# Patient Record
Sex: MALE | Race: BLACK OR AFRICAN AMERICAN | Employment: FULL TIME | URBAN - METROPOLITAN AREA
[De-identification: names, ages, dates, MRNs, and addresses within clinical notes are randomized per-mention and may not be internally consistent; named-entity substitution may affect disease eponyms.]

---

## 2023-12-08 ENCOUNTER — TRANSCRIBE ORDERS (OUTPATIENT)
Facility: HOSPITAL | Age: 58
End: 2023-12-08

## 2023-12-08 DIAGNOSIS — N64.4 NIPPLE TENDERNESS: Primary | ICD-10-CM

## 2024-01-04 ENCOUNTER — HOSPITAL ENCOUNTER (OUTPATIENT)
Facility: HOSPITAL | Age: 59
Discharge: HOME | End: 2024-01-04
Payer: COMMERCIAL

## 2024-01-04 ENCOUNTER — HOSPITAL ENCOUNTER (OUTPATIENT)
Facility: HOSPITAL | Age: 59
End: 2024-01-04
Payer: COMMERCIAL

## 2024-01-04 VITALS — HEIGHT: 71 IN | WEIGHT: 244.93 LBS | BODY MASS INDEX: 34.29 KG/M2

## 2024-01-04 DIAGNOSIS — N64.4 NIPPLE TENDERNESS: ICD-10-CM

## 2024-01-04 PROCEDURE — 77066 DX MAMMO INCL CAD BI: CPT

## 2025-04-28 ENCOUNTER — OFFICE VISIT (OUTPATIENT)
Age: 60
End: 2025-04-28
Payer: COMMERCIAL

## 2025-04-28 VITALS
HEIGHT: 72 IN | RESPIRATION RATE: 18 BRPM | OXYGEN SATURATION: 96 % | WEIGHT: 251.4 LBS | SYSTOLIC BLOOD PRESSURE: 166 MMHG | DIASTOLIC BLOOD PRESSURE: 93 MMHG | BODY MASS INDEX: 34.05 KG/M2 | HEART RATE: 90 BPM | TEMPERATURE: 98.1 F

## 2025-04-28 DIAGNOSIS — M75.92 SHOULDER LESION, LEFT: Primary | ICD-10-CM

## 2025-04-28 PROCEDURE — 3080F DIAST BP >= 90 MM HG: CPT | Performed by: SURGERY

## 2025-04-28 PROCEDURE — 3077F SYST BP >= 140 MM HG: CPT | Performed by: SURGERY

## 2025-04-28 PROCEDURE — 99203 OFFICE O/P NEW LOW 30 MIN: CPT | Performed by: SURGERY

## 2025-04-28 RX ORDER — NALTREXONE HYDROCHLORIDE 50 MG/1
50 TABLET, FILM COATED ORAL DAILY
COMMUNITY
Start: 2025-01-29

## 2025-04-28 RX ORDER — ONDANSETRON 4 MG/1
4 TABLET, ORALLY DISINTEGRATING ORAL EVERY 8 HOURS PRN
COMMUNITY

## 2025-04-28 RX ORDER — ASPIRIN 81 MG/1
81 TABLET, CHEWABLE ORAL DAILY
COMMUNITY

## 2025-04-28 RX ORDER — MELOXICAM 15 MG/1
15 TABLET ORAL DAILY
COMMUNITY
Start: 2025-04-10

## 2025-04-28 RX ORDER — THERA TABS 400 MCG
1 TAB ORAL DAILY
COMMUNITY

## 2025-04-28 ASSESSMENT — ENCOUNTER SYMPTOMS
NAUSEA: 0
EYE PAIN: 0
COUGH: 0
BLOOD IN STOOL: 0
CONSTIPATION: 0
SHORTNESS OF BREATH: 0
DIARRHEA: 0
WHEEZING: 0
ABDOMINAL PAIN: 0
VOMITING: 0
BACK PAIN: 1
STRIDOR: 0
SORE THROAT: 0

## 2025-04-28 NOTE — PROGRESS NOTES
Subjective:      Patient ID: William Aparicio is a 59 y.o. male who comes in for consultation by Da Sanders MD for a shoulder mass      Chief Complaint   Patient presents with    New Patient     NP: Cyst on shoulder, Da Sanders [facility], notes in folder       HPI    He has had a lesion on his left shoulder for over two years.  It itches and has been getting larger.  It does not drain or bleed.   He denies anything similar in the past.   It is not painful.  He denies fever, chills, sweats, unexplained weight loss or bone pain.      Past Medical History:   Diagnosis Date    Arthritis     Erectile dysfunction 2018    Hypertension      Past Surgical History:   Procedure Laterality Date    GI  3/25/15    Laparoscopic small-bowel resection with anastomosis by Dr Daniele Mayorga.    HIP SURGERY  2016    ORTHOPEDIC SURGERY      left hip surgery/pins placed     Family History   Problem Relation Age of Onset    Cancer Mother         kidney cancer     Social History     Tobacco Use    Smoking status: Former     Current packs/day: 0.00     Types: Cigarettes     Quit date: 3/24/2015     Years since quitting: 10.1    Smokeless tobacco: Never   Substance Use Topics    Alcohol use: Not Currently     Alcohol/week: 6.0 standard drinks of alcohol     Comment: No alcohol since new years, per pt    Drug use: No     Current Outpatient Medications   Medication Sig    aspirin 81 MG chewable tablet Take 1 tablet by mouth daily    meloxicam (MOBIC) 15 MG tablet Take 1 tablet by mouth daily    Multiple Vitamin (THERA/BETA-CAROTENE) TABS Take 1 tablet by mouth daily    naltrexone (VIVITROL) 380 MG injection Inject 380 mg into the muscle once    naltrexone (DEPADE) 50 MG tablet Take 1 tablet by mouth daily    ondansetron (ZOFRAN-ODT) 4 MG disintegrating tablet Take 1 tablet by mouth every 8 hours as needed for Other (As directed)    hydroCHLOROthiazide (HYDRODIURIL) 25 MG tablet Take 1 tablet by mouth daily    lisinopril (PRINIVIL;ZESTRIL) 20

## 2025-04-28 NOTE — PROGRESS NOTES
Identified pt with two pt identifiers (name and ). Reviewed chart in preparation for visit and have obtained necessary documentation.    William Aparicio is a 59 y.o. male  Chief Complaint   Patient presents with    New Patient     NP: Cyst on shoulder, CANELO Guaman, notes in folder     BP (!) 166/93 (BP Site: Right Upper Arm, Patient Position: Sitting, BP Cuff Size: Large Adult)   Pulse 90   Temp 98.1 °F (36.7 °C) (Oral)   Resp 18   Ht 1.829 m (6')   Wt 114 kg (251 lb 6.4 oz)   SpO2 96%   BMI 34.10 kg/m²     1. Have you been to the ER, urgent care clinic since your last visit?  Hospitalized since your last visit? No    2. Have you seen or consulted any other health care providers outside of the [facility] since your last visit?  Include any pap smears or colon screening. PCP    Patient reports spot on shoulder not painful, just uncomfortable and itchy     Patient and provider made aware of elevated BP x2. Patient asymptomatic. Patient reminded to monitor BP, continue to take BP medications if prescribed, and follow up with PCP/Cardiologist.  Patient expressed understanding and agreement.

## 2025-05-30 ENCOUNTER — PROCEDURE VISIT (OUTPATIENT)
Age: 60
End: 2025-05-30

## 2025-05-30 ENCOUNTER — HOSPITAL ENCOUNTER (OUTPATIENT)
Facility: HOSPITAL | Age: 60
Setting detail: SPECIMEN
Discharge: HOME | End: 2025-06-02

## 2025-05-30 VITALS
OXYGEN SATURATION: 95 % | WEIGHT: 250 LBS | DIASTOLIC BLOOD PRESSURE: 80 MMHG | HEART RATE: 85 BPM | HEIGHT: 72 IN | SYSTOLIC BLOOD PRESSURE: 126 MMHG | RESPIRATION RATE: 19 BRPM | BODY MASS INDEX: 33.86 KG/M2

## 2025-05-30 DIAGNOSIS — M75.92 SHOULDER LESION, LEFT: ICD-10-CM

## 2025-05-30 DIAGNOSIS — M75.92 SHOULDER LESION, LEFT: Primary | ICD-10-CM

## 2025-05-30 RX ORDER — LIDOCAINE HYDROCHLORIDE AND EPINEPHRINE BITARTRATE 20; .01 MG/ML; MG/ML
10 INJECTION, SOLUTION SUBCUTANEOUS ONCE
Status: COMPLETED | OUTPATIENT
Start: 2025-05-30 | End: 2025-05-30

## 2025-05-30 RX ADMIN — LIDOCAINE HYDROCHLORIDE AND EPINEPHRINE BITARTRATE 10 ML: 20; .01 INJECTION, SOLUTION SUBCUTANEOUS at 14:22

## 2025-05-30 ASSESSMENT — PATIENT HEALTH QUESTIONNAIRE - PHQ9
SUM OF ALL RESPONSES TO PHQ QUESTIONS 1-9: 0
1. LITTLE INTEREST OR PLEASURE IN DOING THINGS: NOT AT ALL
2. FEELING DOWN, DEPRESSED OR HOPELESS: NOT AT ALL
SUM OF ALL RESPONSES TO PHQ QUESTIONS 1-9: 0

## 2025-05-30 NOTE — PROGRESS NOTES
Identified pt with two pt identifiers (name and ). Reviewed chart in preparation for visit and have obtained necessary documentation.    William Aparicio is a 59 y.o. male Procedure (Procedure: Excise Shoulder mass TENA- Chauncey C @ 9:10am on 25)  .    Vitals:    25 1157   BP: 126/80   BP Site: Left Upper Arm   Patient Position: Sitting   BP Cuff Size: Large Adult   Pulse: 85   Resp: 19   SpO2: 95%   Weight: 113.4 kg (250 lb)   Height: 1.829 m (6' 0.01\")          1. Have you been to the ER, urgent care clinic since your last visit?  Hospitalized since your last visit?  no     2. Have you seen or consulted any other health care providers outside of the [facility] since your last visit?  Include any pap smears or colon screening.  no

## 2025-05-30 NOTE — PROGRESS NOTES
Procedure Note     Pre OP Dx:      Left posterior shoulder mass  Post OP DxLeft posterior shoulder mass  ProcedureExcision of 2 cm left posterior shoulder lesion and 3 cm layered repair of the defect  Qian  Anesthesia      1% Lidocaine with epi  10 ml  EBL                 Minimal  Pathology        Left posterior shoulder mass     Procedure  After informed consent and time out, the left posterior shoulder was prepped with chlorhexidine and draped sterilely.  Local anesthetic was injected into the skin and subcutaneous tissues around the mass.  A 3 cm elliptical skin incision was made over the mass and the mass was sharply excised.   Electrocautery was utilized to control bleeding. Interrupted 3-0 vicryl was used to close the deep layers and deep dermis and a running 4-0 vicryl was utilized as a subcuticular closure.  SteriStrips and a sterile dressing was applied.  The patient tolerated the procedure well.     Scottie Panda MD FACS

## 2025-06-23 ENCOUNTER — OFFICE VISIT (OUTPATIENT)
Age: 60
End: 2025-06-23

## 2025-06-23 VITALS
RESPIRATION RATE: 12 BRPM | SYSTOLIC BLOOD PRESSURE: 128 MMHG | BODY MASS INDEX: 33.13 KG/M2 | OXYGEN SATURATION: 98 % | DIASTOLIC BLOOD PRESSURE: 79 MMHG | HEART RATE: 80 BPM | WEIGHT: 244.6 LBS | HEIGHT: 72 IN | TEMPERATURE: 97.3 F

## 2025-06-23 DIAGNOSIS — Z48.89 ENCOUNTER FOR POSTOPERATIVE CARE: Primary | ICD-10-CM

## 2025-06-23 PROCEDURE — 99024 POSTOP FOLLOW-UP VISIT: CPT | Performed by: SURGERY

## 2025-06-23 ASSESSMENT — PATIENT HEALTH QUESTIONNAIRE - PHQ9
SUM OF ALL RESPONSES TO PHQ QUESTIONS 1-9: 0
2. FEELING DOWN, DEPRESSED OR HOPELESS: NOT AT ALL
1. LITTLE INTEREST OR PLEASURE IN DOING THINGS: NOT AT ALL
SUM OF ALL RESPONSES TO PHQ QUESTIONS 1-9: 0

## 2025-06-23 NOTE — PROGRESS NOTES
Identified pt with two pt identifiers (name and ). Reviewed chart in preparation for visit and have obtained necessary documentation.    William Aparicio is a 59 y.o. male Follow-up (S/p Excision of 2 cm left posterior shoulder lesion and 3 cm layered repair of the defect on 25)  .    Vitals:    25 0754   BP: 128/79   BP Site: Left Upper Arm   Patient Position: Sitting   Pulse: 80   Resp: 12   Temp: 97.3 °F (36.3 °C)   TempSrc: Oral   SpO2: 98%   Weight: 110.9 kg (244 lb 9.6 oz)   Height: 1.829 m (6')          1. Have you been to the ER, urgent care clinic since your last visit?  Hospitalized since your last visit?  no     2. Have you seen or consulted any other health care providers outside of the [facility] since your last visit?  Include any pap smears or colon screening.  no

## 2025-06-23 NOTE — PROGRESS NOTES
Surgery  Follow up    Procedure: excision left shoulder mass  OR date:  5/30/2025  Path:    FINAL PATHOLOGIC DIAGNOSIS          Skin, left posterior shoulder, excision:        Trichilemmal (pilar) cyst, rupture.     S I feel fine, no pain or drainage    /79 (BP Site: Left Upper Arm, Patient Position: Sitting)   Pulse 80   Temp 97.3 °F (36.3 °C) (Oral)   Resp 12   Ht 1.829 m (6')   Wt 110.9 kg (244 lb 9.6 oz)   SpO2 98%   BMI 33.17 kg/m²     O Incisions healing well without infection   No signs of seroma/hematoma    A/P Doing well   Discussed benign path   RTW already   RTC prn    Scottie Panda MD FACS